# Patient Record
Sex: FEMALE | Race: OTHER
[De-identification: names, ages, dates, MRNs, and addresses within clinical notes are randomized per-mention and may not be internally consistent; named-entity substitution may affect disease eponyms.]

---

## 2021-03-28 ENCOUNTER — TRANSCRIPTION ENCOUNTER (OUTPATIENT)
Age: 26
End: 2021-03-28

## 2021-04-21 ENCOUNTER — TRANSCRIPTION ENCOUNTER (OUTPATIENT)
Age: 26
End: 2021-04-21

## 2022-04-04 PROBLEM — Z00.00 ENCOUNTER FOR PREVENTIVE HEALTH EXAMINATION: Status: ACTIVE | Noted: 2022-04-04

## 2022-04-05 ENCOUNTER — APPOINTMENT (OUTPATIENT)
Dept: ORTHOPEDIC SURGERY | Facility: AMBULATORY SURGERY CENTER | Age: 27
End: 2022-04-05
Payer: COMMERCIAL

## 2022-04-05 ENCOUNTER — RESULT REVIEW (OUTPATIENT)
Age: 27
End: 2022-04-05

## 2022-04-05 PROCEDURE — 20900 REMOVAL OF BONE FOR GRAFT: CPT

## 2022-04-05 PROCEDURE — 73620 X-RAY EXAM OF FOOT: CPT | Mod: 26,LT

## 2022-04-05 PROCEDURE — 73600 X-RAY EXAM OF ANKLE: CPT | Mod: 26,LT

## 2022-04-05 PROCEDURE — 28292 COR HLX VLGS RSC PRX PHLX BS: CPT | Mod: 59,LT

## 2022-04-05 PROCEDURE — 27610 EXPLORE/TREAT ANKLE JOINT: CPT | Mod: 22,59,LT

## 2022-04-05 PROCEDURE — 64450 NJX AA&/STRD OTHER PN/BRANCH: CPT | Mod: 59,79

## 2022-04-05 PROCEDURE — 27620 EXPLORE/TREAT ANKLE JOINT: CPT | Mod: LT

## 2022-04-05 PROCEDURE — 29515 APPLICATION SHORT LEG SPLINT: CPT | Mod: 59,LT

## 2022-04-05 PROCEDURE — 28124 PARTIAL REMOVAL OF TOE: CPT | Mod: TA,59

## 2022-04-05 PROCEDURE — 27640 PARTIAL REMOVAL OF TIBIA: CPT | Mod: 59,LT

## 2022-04-15 ENCOUNTER — APPOINTMENT (OUTPATIENT)
Dept: ORTHOPEDIC SURGERY | Facility: CLINIC | Age: 27
End: 2022-04-15
Payer: COMMERCIAL

## 2022-04-15 PROCEDURE — 73610 X-RAY EXAM OF ANKLE: CPT | Mod: LT

## 2022-04-15 PROCEDURE — 99024 POSTOP FOLLOW-UP VISIT: CPT

## 2022-04-15 PROCEDURE — L4361: CPT

## 2022-04-15 NOTE — HISTORY OF PRESENT ILLNESS
[3] : 3 [2] : 2 [Diffuse] : diffuse [Dull/Aching] : dull/aching [Rest] : rest [Sitting] : sitting [Standing] : standing [Walking] : walking [Stairs] : stairs [de-identified] : Pt. presents for f/u of her LT ankle. She is s/p LT ankle arthroscopy with microfracture medial talar OCD on 4/8/14 and most recently ankle arthrotomy with osteochondral (DeNovo) grafting and tibial plafondplasty on 4/5/22. NWB in splint, using crutches but she does report falling this morning. No fevers, chills, sweats. Post-op pain well managed.  [] : no [FreeTextEntry1] : L ankle  [de-identified] : 4/5/22 [de-identified] : L ankle med mall osteotomy, denovo cartilage grafting, ankle arthrotomy, tibial autograft

## 2022-04-15 NOTE — ASSESSMENT
[FreeTextEntry1] : Patient is doing well.  Sutures removed today.  \par She was instructed to be strictly nwb to the right ankle.\par Active ROM exercises should be done at least 5 times per day.\par \par Repeat x-ray will be performed at the next office visit\par \par

## 2022-04-15 NOTE — PHYSICAL EXAM
[2+] : posterior tibialis pulse: 2+ [Normal] : saphenous nerve sensation normal [Left] : left ankle [Mild] : mild diffused ankle swelling [] : non-antalgic [No loss of surgical correlation. Bony alignment acceptable. Hardware in appropriate position] : No loss of surgical correlation. Bony alignment acceptable. Hardware in appropriate position [FreeTextEntry9] : Medial talar dome lucency is improved from bone grafting. [de-identified] : plantar flexion 30 degrees [de-identified] : inversion 10 degrees [de-identified] : eversion 10 degrees [TWNoteComboBox7] : dorsiflexion 10 degrees

## 2022-05-13 ENCOUNTER — APPOINTMENT (OUTPATIENT)
Dept: ORTHOPEDIC SURGERY | Facility: CLINIC | Age: 27
End: 2022-05-13
Payer: COMMERCIAL

## 2022-05-13 VITALS — BODY MASS INDEX: 42.52 KG/M2 | WEIGHT: 240 LBS | HEIGHT: 63 IN

## 2022-05-13 VITALS — BODY MASS INDEX: 42.52 KG/M2 | HEIGHT: 63 IN | WEIGHT: 240 LBS

## 2022-05-13 VITALS — HEIGHT: 63 IN

## 2022-05-13 PROCEDURE — 73610 X-RAY EXAM OF ANKLE: CPT | Mod: LT

## 2022-05-13 PROCEDURE — 99024 POSTOP FOLLOW-UP VISIT: CPT

## 2022-05-13 NOTE — PHYSICAL EXAM
[2+] : posterior tibialis pulse: 2+ [Normal] : saphenous nerve sensation normal [Left] : left ankle [No loss of surgical correlation. Bony alignment acceptable. Hardware in appropriate position] : No loss of surgical correlation. Bony alignment acceptable. Hardware in appropriate position [5___] : inversion 5[unfilled]/5 [4___] : eversion 4[unfilled]/5 [] : no pain when stressing lateral tarsal metatarsal joint [FreeTextEntry3] : MInimal diffuse ankle swelling.  [FreeTextEntry9] : Medial talar dome lucency is improved from bone grafting. [de-identified] : plantar flexion 30 degrees [de-identified] : inversion 15 degrees [de-identified] : eversion 15 degrees [TWNoteComboBox7] : dorsiflexion -5 degrees

## 2022-05-13 NOTE — PHYSICAL EXAM
[2+] : posterior tibialis pulse: 2+ [Normal] : saphenous nerve sensation normal [Left] : left ankle [No loss of surgical correlation. Bony alignment acceptable. Hardware in appropriate position] : No loss of surgical correlation. Bony alignment acceptable. Hardware in appropriate position [5___] : inversion 5[unfilled]/5 [4___] : eversion 4[unfilled]/5 [] : no pain when stressing lateral tarsal metatarsal joint [FreeTextEntry3] : MInimal diffuse ankle swelling.  [FreeTextEntry9] : Medial talar dome lucency is improved from bone grafting. [de-identified] : plantar flexion 30 degrees [de-identified] : inversion 15 degrees [de-identified] : eversion 15 degrees [TWNoteComboBox7] : dorsiflexion -5 degrees

## 2022-05-13 NOTE — HISTORY OF PRESENT ILLNESS
[Constant] : constant [3] : 3 [2] : 2 [Diffuse] : diffuse [Dull/Aching] : dull/aching [Rest] : rest [Sitting] : sitting [Standing] : standing [Walking] : walking [Stairs] : stairs [de-identified] : Pt. presents for f/u of her LT ankle. She is s/p LT ankle arthroscopy with microfracture medial talar OCD on 4/8/14 and most recently ankle arthrotomy with osteochondral (DeNovo) grafting and tibial plafondplasty on 4/5/22. NWB in CAM boot. Doing well. Doing ankle ROM exercises. NWB in wheelchair today, using crutches at home.  [] : no [FreeTextEntry1] : L ankle  [FreeTextEntry5] : MVI None [de-identified] : 04/05/22 [de-identified] : faith [de-identified] : 04/05/22 [de-identified] : [facility] [de-identified] : surgery on the ankle [de-identified] : 4/5/22 [de-identified] : L ankle med mall osteotomy, denovo cartilage grafting, ankle arthrotomy, tibial autograft

## 2022-05-13 NOTE — HISTORY OF PRESENT ILLNESS
[Constant] : constant [3] : 3 [2] : 2 [Diffuse] : diffuse [Dull/Aching] : dull/aching [Rest] : rest [Sitting] : sitting [Standing] : standing [Walking] : walking [Stairs] : stairs [de-identified] : Pt. presents for f/u of her LT ankle. She is s/p LT ankle arthroscopy with microfracture medial talar OCD on 4/8/14 and most recently ankle arthrotomy with osteochondral (DeNovo) grafting and tibial plafondplasty on 4/5/22. NWB in CAM boot. Doing well. Doing ankle ROM exercises. NWB in wheelchair today, using crutches at home.  [] : no [FreeTextEntry1] : L ankle  [FreeTextEntry5] : MVI None [de-identified] : 04/05/22 [de-identified] : faith [de-identified] : 04/05/22 [de-identified] : [facility] [de-identified] : surgery on the ankle [de-identified] : 4/5/22 [de-identified] : L ankle med mall osteotomy, denovo cartilage grafting, ankle arthrotomy, tibial autograft

## 2022-05-13 NOTE — ASSESSMENT
[FreeTextEntry1] : Pt. is doing well. \par She can begin WBAT in CAM boot.\par Ice to affected area.\par Begin PT.\par \par Patient was instructed that they can not operate an automatic vehicle while wearing a CAM boot or cast on the right lower extremity. If operating a vehicle that requires use of a clutch, patient may not drive while wearing a CAM boot or cast on the left lower extremity.\par \par Repeat x-ray will be performed at the next office visit\par \par

## 2022-06-10 ENCOUNTER — APPOINTMENT (OUTPATIENT)
Dept: ORTHOPEDIC SURGERY | Facility: CLINIC | Age: 27
End: 2022-06-10
Payer: COMMERCIAL

## 2022-06-10 PROCEDURE — 73610 X-RAY EXAM OF ANKLE: CPT | Mod: LT

## 2022-06-10 PROCEDURE — 99024 POSTOP FOLLOW-UP VISIT: CPT

## 2022-06-10 NOTE — PHYSICAL EXAM
[2+] : posterior tibialis pulse: 2+ [Normal] : saphenous nerve sensation normal [Left] : left ankle [No loss of surgical correlation. Bony alignment acceptable. Hardware in appropriate position] : No loss of surgical correlation. Bony alignment acceptable. Hardware in appropriate position [NL (40)] : plantar flexion 40 degrees [NL 30)] : inversion 30 degrees [5___] : plantar flexion 5[unfilled]/5 [4___] : eversion 4[unfilled]/5 [] : non-antalgic [Weight -] : weightbearing [FreeTextEntry9] : Medial talar dome lucency is present; no fragmentation noted. [de-identified] : plantar flexion 30 degrees [de-identified] : inversion 0 degrees [de-identified] : eversion 15 degrees [TWNoteComboBox7] : dorsiflexion 5 degrees

## 2022-06-10 NOTE — ASSESSMENT
[FreeTextEntry1] : Patient continues to do well.  She no longer needs the cam walker\par Wbat in supportive sneaker\par Continue with PT and home exercises\par No high impact activities\par \par Repeat x-ray will be performed at the next office visit.\par \par

## 2022-06-10 NOTE — HISTORY OF PRESENT ILLNESS
[3] : 3 [2] : 2 [Diffuse] : diffuse [Dull/Aching] : dull/aching [Rest] : rest [Sitting] : sitting [Standing] : standing [Walking] : walking [Stairs] : stairs [de-identified] : Pt. presents for post op of her LT ankle. She is s/p LT ankle arthroscopy with microfracture medial talar OCD on 4/8/14 and most recently ankle arthrotomy with osteochondral (DeNovo) grafting and tibial plafondplasty on 4/5/22. WBAT in CAM boot. She has been going to PT and reports doing very well.  No real pain at this time.  She does report some swelling at the end of the day. [] : no [FreeTextEntry1] : L ankle  [de-identified] : 4/5/22 [de-identified] : L ankle med mall osteotomy, denovo cartilage grafting, ankle arthrotomy, tibial autograft

## 2022-07-25 ENCOUNTER — APPOINTMENT (OUTPATIENT)
Dept: ORTHOPEDIC SURGERY | Facility: CLINIC | Age: 27
End: 2022-07-25

## 2022-07-25 PROCEDURE — 73610 X-RAY EXAM OF ANKLE: CPT | Mod: LT

## 2022-07-25 PROCEDURE — 99213 OFFICE O/P EST LOW 20 MIN: CPT

## 2022-07-25 NOTE — ASSESSMENT
[FreeTextEntry1] : Pt. doing very well.\par Transition to home exercises.\par Avoid high impact activities.\par \par Repeat x-ray will be performed at the next office visit.\par \par

## 2022-07-25 NOTE — PHYSICAL EXAM
[NL (40)] : plantar flexion 40 degrees [NL 30)] : inversion 30 degrees [5___] : inversion 5[unfilled]/5 [4___] : eversion 4[unfilled]/5 [2+] : posterior tibialis pulse: 2+ [Normal] : saphenous nerve sensation normal [Left] : left ankle [Weight -] : weightbearing [No loss of surgical correlation. Bony alignment acceptable. Hardware in appropriate position] : No loss of surgical correlation. Bony alignment acceptable. Hardware in appropriate position [NL (20)] : eversion 20 degrees [] : non-antalgic [FreeTextEntry9] : Medial talar dome lucency is present but less apparent compared to previous films; no fragmentation noted. [de-identified] : eversion 15 degrees [TWNoteComboBox7] : dorsiflexion 10 degrees

## 2022-07-25 NOTE — HISTORY OF PRESENT ILLNESS
[3] : 3 [2] : 2 [Diffuse] : diffuse [Dull/Aching] : dull/aching [Rest] : rest [Sitting] : sitting [Standing] : standing [Walking] : walking [Stairs] : stairs [de-identified] : Pt. presents for post op of her LT ankle. She is s/p LT ankle arthroscopy with microfracture medial talar OCD on 4/8/14 and most recently ankle arthrotomy with osteochondral (DeNovo) grafting and tibial plafondplasty on 4/5/22. WB in sneakers w/o bracing. She has been going to PT and reports doing very well.  No real pain at this time.  She does report some swelling at the end of the day. [] : no [FreeTextEntry1] : L ankle  [de-identified] : 4/5/22 [de-identified] : L ankle med mall osteotomy, denovo cartilage grafting, ankle arthrotomy, tibial autograft

## 2022-10-26 ENCOUNTER — APPOINTMENT (OUTPATIENT)
Dept: ORTHOPEDIC SURGERY | Facility: CLINIC | Age: 27
End: 2022-10-26
Payer: COMMERCIAL

## 2022-10-26 VITALS — WEIGHT: 240 LBS | BODY MASS INDEX: 42.52 KG/M2 | HEIGHT: 63 IN

## 2022-10-26 DIAGNOSIS — Z98.890 OTHER SPECIFIED POSTPROCEDURAL STATES: ICD-10-CM

## 2022-10-26 DIAGNOSIS — Z78.9 OTHER SPECIFIED HEALTH STATUS: ICD-10-CM

## 2022-10-26 DIAGNOSIS — M65.9 SYNOVITIS AND TENOSYNOVITIS, UNSPECIFIED: ICD-10-CM

## 2022-10-26 DIAGNOSIS — M93.272 OSTEOCHONDRITIS DISSECANS, LEFT ANKLE AND JOINTS OF LEFT FOOT: ICD-10-CM

## 2022-10-26 PROCEDURE — 73610 X-RAY EXAM OF ANKLE: CPT | Mod: LT

## 2022-10-26 PROCEDURE — 99213 OFFICE O/P EST LOW 20 MIN: CPT

## 2022-10-26 RX ORDER — MELOXICAM 15 MG/1
15 TABLET ORAL DAILY
Qty: 30 | Refills: 3 | Status: COMPLETED | COMMUNITY
Start: 2022-10-26 | End: 1900-01-01

## 2022-10-26 NOTE — HISTORY OF PRESENT ILLNESS
[Dull/Aching] : dull/aching [Sharp] : sharp [Frequent] : frequent [Household chores] : household chores [Leisure] : leisure [Rest] : rest [Meds] : meds [Ice] : ice [Walking] : walking [de-identified] : Pt. presents for post op of her LT ankle. She is s/p LT ankle arthroscopy with microfracture medial talar OCD on 4/8/14 and most recently ankle arthrotomy with osteochondral (DeNovo) grafting and tibial plafondplasty on 4/5/22. WB in [email or web address]. She reports since early October 2022 she has been having ankle pain. No injury/trauma but has been on her feet more with work.  [3] : 3 [2] : 2 [Diffuse] : diffuse [Sitting] : sitting [Standing] : standing [Stairs] : stairs [] : Post Surgical Visit: yes [FreeTextEntry1] : L ankle  [FreeTextEntry5] : Pt is a 27 y/o F here for an follow up for left ankle pain. Pt states that she's developed a pain that comes and goes in her left ankle, which she had surgery on back in April. Pain can go up to an 8 at its worst. [FreeTextEntry7] : Pain radiates up the left ankle to the back occasionally  [FreeTextEntry9] : Medical marijuana  [de-identified] : 4/5/22 [de-identified] : 4/5/22 [de-identified] : L ankle med mall osteotomy, denovo cartilage grafting, ankle arthrotomy, tibial autograft

## 2022-10-26 NOTE — ASSESSMENT
[FreeTextEntry1] : WBAT in supportive footwear.\par Ice to affected area.\par \par The patient was explained the options as well as benefits of over the counter verses prescription strength nonsteroidal anti-inflammatory medication. The patient opts for a prescription strength medication.\par \par Compression sleeve while at work.\par Discussed role of steroid injection, patient declines. \par

## 2022-10-26 NOTE — PHYSICAL EXAM
[NL (40)] : plantar flexion 40 degrees [NL 30)] : inversion 30 degrees [NL (20)] : eversion 20 degrees [5___] : eversion 5[unfilled]/5 [2+] : posterior tibialis pulse: 2+ [Normal] : saphenous nerve sensation normal [] : non-antalgic [Left] : left ankle [Weight -] : weightbearing [No loss of surgical correlation. Bony alignment acceptable. Hardware in appropriate position] : No loss of surgical correlation. Bony alignment acceptable. Hardware in appropriate position [FreeTextEntry9] : Medial talar dome lucency continues to improve compared to previous films.  [TWNoteComboBox7] : dorsiflexion 10 degrees

## 2022-12-07 ENCOUNTER — APPOINTMENT (OUTPATIENT)
Dept: ORTHOPEDIC SURGERY | Facility: CLINIC | Age: 27
End: 2022-12-07